# Patient Record
Sex: FEMALE | Race: WHITE | Employment: OTHER | URBAN - METROPOLITAN AREA
[De-identification: names, ages, dates, MRNs, and addresses within clinical notes are randomized per-mention and may not be internally consistent; named-entity substitution may affect disease eponyms.]

---

## 2021-09-02 ENCOUNTER — INITIAL CONSULT (OUTPATIENT)
Dept: PAIN MANAGEMENT | Age: 66
End: 2021-09-02
Payer: OTHER GOVERNMENT

## 2021-09-02 VITALS
HEIGHT: 64 IN | SYSTOLIC BLOOD PRESSURE: 136 MMHG | BODY MASS INDEX: 37.22 KG/M2 | HEART RATE: 72 BPM | WEIGHT: 218 LBS | TEMPERATURE: 97.7 F | DIASTOLIC BLOOD PRESSURE: 80 MMHG

## 2021-09-02 DIAGNOSIS — M47.812 CERVICAL SPONDYLOSIS WITHOUT MYELOPATHY: Primary | ICD-10-CM

## 2021-09-02 DIAGNOSIS — M79.601 RIGHT ARM PAIN: ICD-10-CM

## 2021-09-02 PROCEDURE — 99203 OFFICE O/P NEW LOW 30 MIN: CPT | Performed by: PHYSICAL MEDICINE & REHABILITATION

## 2021-09-02 RX ORDER — PANTOPRAZOLE SODIUM 40 MG/1
TABLET, DELAYED RELEASE ORAL
COMMUNITY
Start: 2021-06-21

## 2021-09-02 RX ORDER — TRAZODONE HYDROCHLORIDE 50 MG/1
TABLET ORAL
COMMUNITY
Start: 2021-08-27

## 2021-09-02 RX ORDER — DULOXETIN HYDROCHLORIDE 30 MG/1
CAPSULE, DELAYED RELEASE ORAL
COMMUNITY

## 2021-09-02 RX ORDER — METFORMIN HYDROCHLORIDE 500 MG/1
TABLET, EXTENDED RELEASE ORAL
COMMUNITY
Start: 2021-09-01

## 2021-09-02 RX ORDER — ESZOPICLONE 3 MG/1
TABLET, FILM COATED ORAL
COMMUNITY

## 2021-09-02 RX ORDER — LIDOCAINE 40 MG/G
CREAM TOPICAL
Qty: 45 G | Refills: 0 | Status: SHIPPED | OUTPATIENT
Start: 2021-09-02

## 2021-09-02 ASSESSMENT — ENCOUNTER SYMPTOMS
CONSTIPATION: 0
BACK PAIN: 0
DIARRHEA: 0
SHORTNESS OF BREATH: 0
NAUSEA: 0
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
## 2021-09-03 ENCOUNTER — TELEPHONE (OUTPATIENT)
Dept: PAIN MANAGEMENT | Age: 66
End: 2021-09-03

## 2021-09-03 NOTE — TELEPHONE ENCOUNTER
BILAT C4-5,C6-,C7-T1 FACET    NO AUTH REQUIRED    OK to schedule procedure approved as above. Please note sides/levels approved and date range. (If applicable, sides/levels approved may differ from those ordered)    TO BE SCHEDULED WITH DR. Aashish Traylor

## 2021-09-03 NOTE — TELEPHONE ENCOUNTER
ORDER PLACED:    Date: 9/2/21  Description: -BILAT C4-5,C6-,C7-T1 FACET  Order Number: [id number]  Ordering Provider: [facility]  Performing Provider:   CPT Codes: 52571,72830,74638  ICD10 Codes: M47.812    PER [facility] WEBPAGE PATIENT IS NOT PRIME, NO AUTH NEEDED

## 2021-09-03 NOTE — TELEPHONE ENCOUNTER
ADIA UPPER EMG    NO AUTH REQUIRED    OK to schedule procedure approved as above. Please note sides/levels approved and date range. (If applicable, sides/levels approved may differ from those ordered)    TO BE SCHEDULED WITH DR. Janet Mariano

## 2021-09-09 ENCOUNTER — TELEPHONE (OUTPATIENT)
Dept: PAIN MANAGEMENT | Age: 66
End: 2021-09-09

## 2021-09-09 NOTE — TELEPHONE ENCOUNTER
Pt scheduled for BILAT C4-5,C6-,C7-T1 FACET on 9/28. Patient is asking Dr. Jeff Mendoza how long before procedure does she have to stop taking advil and aspirin.

## 2021-09-22 ENCOUNTER — TELEPHONE (OUTPATIENT)
Dept: PAIN MANAGEMENT | Age: 66
End: 2021-09-22

## 2021-09-24 ENCOUNTER — TELEPHONE (OUTPATIENT)
Dept: PAIN MANAGEMENT | Age: 66
End: 2021-09-24

## 2021-09-24 NOTE — TELEPHONE ENCOUNTER
BENEFITS: [place] C4-5, C6-7, C7-T1 FACET    Insurance: [address]  Phone: [phone]  Contact Name: Cara Barros  Effective Date: 1.1.2021     Plan year: YES-CALENDAR  Deductible: 150.00      Deductible Met: 150.00  Allowed/benefits paid at: 100%  OOP: 3500.00 MET $1466.92  Freq Limits: 14652, 94224 & 64492-BASED ON MEDICAL NECESSITY  Prior Auth Requirement: NO    Notes: NO PRE-EX CLAUSE    Call Reference #: [id number]    Time of call: 8:50AM

## 2021-09-28 ENCOUNTER — PROCEDURE VISIT (OUTPATIENT)
Dept: PAIN MANAGEMENT | Age: 66
End: 2021-09-28
Payer: OTHER GOVERNMENT

## 2021-09-28 DIAGNOSIS — M47.812 CERVICAL SPONDYLOSIS WITHOUT MYELOPATHY: Primary | ICD-10-CM

## 2021-09-28 PROCEDURE — 64492 INJ PARAVERT F JNT C/T 3 LEV: CPT | Performed by: PHYSICAL MEDICINE & REHABILITATION

## 2021-09-28 PROCEDURE — 64490 INJ PARAVERT F JNT C/T 1 LEV: CPT | Performed by: PHYSICAL MEDICINE & REHABILITATION

## 2021-09-28 PROCEDURE — 64491 INJ PARAVERT F JNT C/T 2 LEV: CPT | Performed by: PHYSICAL MEDICINE & REHABILITATION

## 2021-09-28 RX ORDER — LIDOCAINE HYDROCHLORIDE 10 MG/ML
5 INJECTION, SOLUTION EPIDURAL; INFILTRATION; INTRACAUDAL; PERINEURAL ONCE
Status: COMPLETED | OUTPATIENT
Start: 2021-09-28 | End: 2021-09-28

## 2021-09-28 RX ORDER — DEXAMETHASONE SODIUM PHOSPHATE 10 MG/ML
10 INJECTION, EMULSION INTRAMUSCULAR; INTRAVENOUS ONCE
Status: COMPLETED | OUTPATIENT
Start: 2021-09-28 | End: 2021-09-28

## 2021-09-28 RX ADMIN — Medication 0.5 MEQ: at 11:39

## 2021-09-28 RX ADMIN — DEXAMETHASONE SODIUM PHOSPHATE 10 MG: 10 INJECTION, EMULSION INTRAMUSCULAR; INTRAVENOUS at 11:38

## 2021-09-28 RX ADMIN — LIDOCAINE HYDROCHLORIDE 5 ML: 10 INJECTION, SOLUTION EPIDURAL; INFILTRATION; INTRACAUDAL; PERINEURAL at 11:38

## 2021-09-28 NOTE — PROGRESS NOTES
This procedure was 50% more difficult and required 50% more work secondary to the patient's habitus. The patient has a BMI of 37.4 and has comorbidities of diabetes mellitus, COPD, h/o breast cancer, and osteoarthritis. This required increased work for safe and proper positioning upon the fluoroscopy table, increased needle passes for safe and appropriate needle placement, and increased fluoroscopy time and radiation exposure for proper visualization.

## 2021-09-28 NOTE — PROGRESS NOTES
CERVICAL FACET ZYGAPOPHYSEAL JOINT INJECTION               Patient Name: Romy Murray   : 1955  Date: 2021     Provider: Judie Naranjo MD      Romy Murray is here today for interventional pain management. Preoperatively, the patient presents with cervical facet zygapophyseal joint mediated pain as per history and exam. Patient has failed conservative treatment. Patient has significant functional and psychological impairment due to these conditions. Given her symptoms, physical exam findings, impairment in activities of daily living, and lack of response to conservative measures, consideration for cervical facet zygapophyseal intraarticular joint corticosteroid injections was given. Discussed the risks of the procedure including but not limited to bleeding, infection, worsened pain, damage to surrounding structures, side effects, toxicity, allergic reactions to medications used, immune and stress-response dysfunction, fat necrosis, avascular necrosis, skin pigmentation changes, blood sugar elevation, headache, vision changes, need for surgery, as well as catastrophic injury such as vision loss, paralysis, stroke, spinal cord infarction or injury, intrathecal injection, spinal cord puncture, arachnoiditis, bowel or bladder incontinence, loss of use of the arms and/or legs, ventilator dependence, and death. Discussed the risks, benefits, alternative procedures, and alternatives to the procedure including no procedure at all. Discussed that we cannot undo any permanent neurologic damage or change the course of any underlying disease. After thorough discussion, patient expressed understanding and willingness to proceed. Written consent was obtained and is in the chart. Verbal consent to proceed was obtained. Standard ASIPP guidelines were followed and sterile technique used. Area was cleaned with Betadine three times. Fluoroscopic guidance was used for this procedure.  The C5/6 level is taken to

## 2021-09-29 ENCOUNTER — OFFICE VISIT (OUTPATIENT)
Dept: PAIN MANAGEMENT | Age: 66
End: 2021-09-29
Payer: OTHER GOVERNMENT

## 2021-09-29 VITALS — WEIGHT: 218 LBS | TEMPERATURE: 97.6 F | BODY MASS INDEX: 37.22 KG/M2 | HEIGHT: 64 IN

## 2021-09-29 DIAGNOSIS — M79.601 RIGHT ARM PAIN: Primary | ICD-10-CM

## 2021-09-29 DIAGNOSIS — G56.03 BILATERAL CARPAL TUNNEL SYNDROME: ICD-10-CM

## 2021-09-29 DIAGNOSIS — G89.29 CHRONIC RIGHT SHOULDER PAIN: ICD-10-CM

## 2021-09-29 DIAGNOSIS — M25.511 CHRONIC RIGHT SHOULDER PAIN: ICD-10-CM

## 2021-09-29 PROCEDURE — 95886 MUSC TEST DONE W/N TEST COMP: CPT | Performed by: PHYSICAL MEDICINE & REHABILITATION

## 2021-09-29 PROCEDURE — 95911 NRV CNDJ TEST 9-10 STUDIES: CPT | Performed by: PHYSICAL MEDICINE & REHABILITATION

## 2021-09-29 RX ORDER — CYCLOBENZAPRINE HCL 5 MG
5 TABLET ORAL NIGHTLY
Qty: 30 TABLET | Refills: 0 | Status: SHIPPED | OUTPATIENT
Start: 2021-09-29 | End: 2021-10-29

## 2021-09-29 NOTE — PROGRESS NOTES
-Trial Cyclobenzaprine 5 mg qHS #30 no ref start 9/29/2021 Avoid all other muscle relaxers and/or sedating medicines. -Recommend occupational therapy for bilateral carpal tunnel syndrome  -Recommend bilateral wrist splint(s) for the patient's carpal tunnel syndrome. The patient has weakness and instability of bilateral wrists causing intermittent median nerve compression. She requires stabilization from this rigid orthosis to improve her function and reduce pain.  -Consideration for bilateral carpal tunnel injections under ultrasound guidance may be given if her symptoms do not improve with conservative measures as outlined above or to help facilitate a formal physical therapy program. Discussed the risks including but not limited to bleeding, infection, worsened pain, damage to surrounding structures, side effects, toxicity, allergic reactions to medications used, need for surgery, premature damage or degeneration of the joint, nerve, tendon, or vascular injury, as well as catastrophic injury such as vision loss, paralysis, stroke, bowel or bladder incontinence, ventilator dependence, loss of use of the wrists joint and/or extremities, and death. Discussed the risks, benefits, and alternatives to the procedure including no procedure at all. Discussed that we cannot undo any permanent neurologic or orthopaedic damage or change the course of any underlying disease. After thorough discussion, patient expressed understanding and willingness to proceed. -Right shoulder glenohumeral joint inj under XR with Dr Daphnie Curtis. 15 minute procedure.  Consider 3 mg betamethasone  -XR R Shoulder eval osteoarthritis
activity. Motor unit recruitment is unremarkable. Bilateral mid cervical paraspinal muscle sampling is free of any increased insertional activity or any abnormal spontaneous activity. SUMMARY:  This study is abnormal:  1. There is current electrodiagnostic evidence for a bilateral median mononeuropathy at the wrist consistent with a clinical diagnosis of Bilateral carpal tunnel syndrome. This is mild in degree by electrical criteria bilaterally. There is no active denervation on electromyography bilaterally. 2. There is no current evidence for a bilateral ulnar neuropathy at the elbow, active bilateral cervical motor radiculopathy, or generalized large fiber sensorimotor peripheral polyneuropathy. RECOMMENDATIONS: Today's study does not explain the patient's right arm pain. The findings of bilateral carpal tunnel syndrome should be interpreted within the appropriate clinical context given the patient's history of bilateral carpal tunnel release surgeries. The patient should follow up as previously instructed. If her symptoms persist or worsen, further electrodiagnostic evaluation may be considered if the patient is agreeable. Clinical correlation is recommended.

## 2021-09-30 ENCOUNTER — TELEPHONE (OUTPATIENT)
Dept: PAIN MANAGEMENT | Age: 66
End: 2021-09-30

## 2021-09-30 NOTE — TELEPHONE ENCOUNTER
ORDER PLACED:    Date: 9/29/21  Description: RIGHT SHOULDER GLENOHUMERAL JOINT INJECTION UNDER XR  Order Number: [id number]  Ordering Provider: [id number]  Performing Provider: Kiara Garcia  CPT Codes: 77020  ICD10 Codes: M25.511    PER [employer] WEBPAGE PATIENT IS  SELECT  NO AUTH IS REQUIRED

## 2021-09-30 NOTE — TELEPHONE ENCOUNTER
ADIA CARPAL TUNNEL INJECTION UNDER US    NO AUTH REQUIRED    OK to schedule procedure approved as above. Please note sides/levels approved and date range. (If applicable, sides/levels approved may differ from those ordered)    TO BE SCHEDULED WITH DR. Hortencia Carr

## 2021-09-30 NOTE — TELEPHONE ENCOUNTER
ORDER PLACED:    Date: 9/29/21  Description: BILAT CARPAL TUNNEL BRACE  Order Number: [id number]  Ordering Provider: [facility]  Performing Provider:   CPT Codes:   ICD10 Codes: G56.03    PER [facility] WEBPAGE PATIENT IS  SELECT  NO AUTH IS REQUIRED

## 2021-09-30 NOTE — TELEPHONE ENCOUNTER
RIGHT SHOULDER GLENOHUMERAL JOINT INJECTION UNDER XR    NO AUTH REQUIRED    OK to schedule procedure approved as above. Please note sides/levels approved and date range. (If applicable, sides/levels approved may differ from those ordered)    TO BE SCHEDULED WITH DR. Danni Cooper

## 2021-09-30 NOTE — TELEPHONE ENCOUNTER
ORDER PLACED:    Date: 9/29/21  Description: BILAT CARPAL TUNNEL INJECTION UNDER US  Order Number: [id number]  Ordering Provider: Beatrix Halsted  Performing Provider: Beatrix Halsted  CPT Codes: 44695  ICD10 Codes: G56.03    PER [facility] WEBPAGE PATIENT IS  SELECT  NO AUTH IS REQUIRED

## 2021-10-13 ENCOUNTER — PROCEDURE VISIT (OUTPATIENT)
Dept: PAIN MANAGEMENT | Age: 66
End: 2021-10-13
Payer: OTHER GOVERNMENT

## 2021-10-13 DIAGNOSIS — M25.511 CHRONIC RIGHT SHOULDER PAIN: Primary | ICD-10-CM

## 2021-10-13 DIAGNOSIS — G89.29 CHRONIC RIGHT SHOULDER PAIN: Primary | ICD-10-CM

## 2021-10-13 PROCEDURE — 77002 NEEDLE LOCALIZATION BY XRAY: CPT | Performed by: PHYSICAL MEDICINE & REHABILITATION

## 2021-10-13 PROCEDURE — 20610 DRAIN/INJ JOINT/BURSA W/O US: CPT | Performed by: PHYSICAL MEDICINE & REHABILITATION

## 2021-10-13 RX ORDER — LIDOCAINE HYDROCHLORIDE 10 MG/ML
6 INJECTION, SOLUTION EPIDURAL; INFILTRATION; INTRACAUDAL; PERINEURAL ONCE
Status: COMPLETED | OUTPATIENT
Start: 2021-10-13 | End: 2021-10-13

## 2021-10-13 RX ORDER — BETAMETHASONE SODIUM PHOSPHATE AND BETAMETHASONE ACETATE 3; 3 MG/ML; MG/ML
3 INJECTION, SUSPENSION INTRA-ARTICULAR; INTRALESIONAL; INTRAMUSCULAR; SOFT TISSUE ONCE
Status: COMPLETED | OUTPATIENT
Start: 2021-10-13 | End: 2021-10-13

## 2021-10-13 RX ADMIN — LIDOCAINE HYDROCHLORIDE 6 ML: 10 INJECTION, SOLUTION EPIDURAL; INFILTRATION; INTRACAUDAL; PERINEURAL at 16:39

## 2021-10-13 RX ADMIN — Medication 0.5 MEQ: at 16:37

## 2021-10-13 RX ADMIN — BETAMETHASONE SODIUM PHOSPHATE AND BETAMETHASONE ACETATE 3 MG: 3; 3 INJECTION, SUSPENSION INTRA-ARTICULAR; INTRALESIONAL; INTRAMUSCULAR; SOFT TISSUE at 16:41

## 2021-10-13 NOTE — PROGRESS NOTES
Shoulder Glenohumeral Joint Injection           Patient Name: Chandrakant Villatoro   : 1955  Date: 10/13/2021     Provider: Kera Seaman MD        PROCEDURE: Right glenohumeral joint shoulder corticosteroid injection under fluoroscopic guidance    INDICATIONS: Chandrakant Villatoro is a 61 y.o. female who presents with symptoms and physical exam findings consistent with right shoulder pain. She complains of pain in the right shoulder. A focused physical exam reveals tenderness over the right glenohumeral joint with limited range of motion. She has had persistent pain that limits her activities of daily living such as upper body dressing. The pain is persistent despite conservative measures including home exercise program. Given her symptoms, physical exam findings, impairment in activities of daily living, and lack of response to conservative measures, consideration for Right glenohumeral joint shoulder corticosteroid injection under fluoroscopic guidance was given. Discussed the risks including but not limited to bleeding, infection, worsened pain, damage to surrounding structures, side effects, toxicity, allergic reactions to medications used, immune and stress-response dysfunction, fat necrosis, decreased bone mineralization, cartilage loss, increased fracture risk, avascular necrosis, skin pigmentation changes, blood sugar elevation, need for surgery, premature damage or degeneration of the joint, pneumothorax, as well as catastrophic injury such as vision loss, paralysis, spinal cord or plexus injury, stroke, bowel or bladder incontinence, ventilator dependence, loss of use of the joint and/or extremity, and death. Discussed the risks, benefits, alternative procedures, and alternatives to the procedure including no procedure at all. Discussed that we cannot undo any permanent neurologic or orthopaedic damage or change the course of any underlying disease.  After thorough discussion, patient expressed understanding and willingness to proceed. Written consent was obtained and is in the chart. Verbal consent to proceed was obtained. DESCRIPTION OF PROCEDURE:  The site was marked. A time-out was performed. Fluoroscopy was utilized to visualize the pertinent anatomy. The site was then cleaned with Betadine three times and maintained in a sterile manner throughout the procedure. Then a 27-gauge 1.5 inch needle was used to anesthetize the skin and subcutaneous tissue with 1 mL of 1% preservative-free lidocaine and 0.5 mEq sodium bicarbonate. The needle was then advanced under fluoroscopic guidance onto the humeral head. Aspiration for blood or synovial fluid was negative. Then 5 mL injectate containing 0.5 mL of 3 mg of betamethasone and 4.5 mL of 1% preservative-free lidocaine was injected without difficulty or resistance. The needle was flushed and removed. The site was hemostatic. The site was cleaned and appropriately dressed. The patient tolerated the procedure well. There were no immediate post procedure complications. Post procedure instructions were given. The patient was monitored for a short period of time and discharged home with her baseline neurologic and orthopaedic exam. The patient will follow-up as previously instructed. She was advised that her blood sugars may increase after today's procedure.         [address] [facility] [address]  Phone [phone]/[facility] [phone]

## 2021-10-26 ENCOUNTER — OFFICE VISIT (OUTPATIENT)
Dept: PAIN MANAGEMENT | Age: 66
End: 2021-10-26
Payer: OTHER GOVERNMENT

## 2021-10-26 VITALS
TEMPERATURE: 97.1 F | SYSTOLIC BLOOD PRESSURE: 110 MMHG | WEIGHT: 220 LBS | HEIGHT: 64 IN | BODY MASS INDEX: 37.56 KG/M2 | DIASTOLIC BLOOD PRESSURE: 58 MMHG

## 2021-10-26 DIAGNOSIS — M47.812 CERVICAL SPONDYLOSIS WITHOUT MYELOPATHY: ICD-10-CM

## 2021-10-26 DIAGNOSIS — M48.02 FORAMINAL STENOSIS OF CERVICAL REGION: Primary | ICD-10-CM

## 2021-10-26 DIAGNOSIS — M54.12 CERVICAL RADICULITIS: ICD-10-CM

## 2021-10-26 DIAGNOSIS — M25.511 CHRONIC RIGHT SHOULDER PAIN: ICD-10-CM

## 2021-10-26 DIAGNOSIS — G89.29 CHRONIC RIGHT SHOULDER PAIN: ICD-10-CM

## 2021-10-26 PROBLEM — E11.9 NEWLY DIAGNOSED DIABETES (HCC): Status: ACTIVE | Noted: 2021-10-26

## 2021-10-26 PROBLEM — Z98.1 HISTORY OF FUSION OF CERVICAL SPINE: Status: ACTIVE | Noted: 2020-08-06

## 2021-10-26 PROBLEM — G93.32 CHRONIC FATIGUE SYNDROME: Status: ACTIVE | Noted: 2021-10-26

## 2021-10-26 PROBLEM — Z85.3 HISTORY OF BREAST CANCER: Status: ACTIVE | Noted: 2020-08-06

## 2021-10-26 PROBLEM — M79.7 FIBROMYOSITIS: Status: ACTIVE | Noted: 2021-10-26

## 2021-10-26 PROBLEM — M47.816 LUMBAR SPONDYLOSIS: Status: ACTIVE | Noted: 2019-02-26

## 2021-10-26 PROBLEM — H40.20X0 ANGLE-CLOSURE GLAUCOMA: Status: ACTIVE | Noted: 2021-10-26

## 2021-10-26 PROBLEM — K21.9 GASTROESOPHAGEAL REFLUX DISEASE WITHOUT ESOPHAGITIS: Status: ACTIVE | Noted: 2021-02-17

## 2021-10-26 PROCEDURE — 99213 OFFICE O/P EST LOW 20 MIN: CPT | Performed by: NURSE PRACTITIONER

## 2021-10-26 RX ORDER — SEMAGLUTIDE 1.34 MG/ML
INJECTION, SOLUTION SUBCUTANEOUS
COMMUNITY
Start: 2021-09-27

## 2021-10-26 ASSESSMENT — ENCOUNTER SYMPTOMS
COUGH: 0
SHORTNESS OF BREATH: 0
TROUBLE SWALLOWING: 0
BACK PAIN: 0
CONSTIPATION: 0
GASTROINTESTINAL NEGATIVE: 1
DIARRHEA: 0
EYES NEGATIVE: 1

## 2021-10-26 NOTE — PROGRESS NOTES
Michael Gallegos  ([id number])    10/26/2021    Subjective:     Michael Gallegos is 61 y.o. female who complains today of:    Chief Complaint   Patient presents with    Neck Pain    Shoulder Pain     Right         Allergies:  Contrast [barium-containing compounds] and Morphine    Past Medical History:   Diagnosis Date    [facility]     Cerebrovascular disease     Chronic pain     COPD (chronic obstructive pulmonary disease) (HCC)     Depression     Fibromyalgia     Gastrointestinal problems related to pregnancy     Neuropathy     Neuropathy in diabetes ([facility]     Osteoarthritis      Past Surgical History:   Procedure Laterality Date    CARPAL TUNNEL RELEASE Bilateral     CERVICAL SPINE SURGERY      C5/6 with Dr Germán Bowers      ULNAR TUNNEL RELEASE Left     Left ulnar nerve transposition at the elbow     Family History   Problem Relation Age of Onset    Arthritis Mother     Cancer Mother     Diabetes Mother     Arthritis Father     Cancer Father     Diabetes Father      Social History     Socioeconomic History    Marital status:      Spouse name: Not on file    Number of children: Not on file    Years of education: Not on file    Highest education level: Not on file   Occupational History    Not on file   Tobacco Use    Smoking status: Never Smoker    Smokeless tobacco: Never Used   Substance and Sexual Activity    Alcohol use: Not Currently    Drug use: Never    Sexual activity: Not on file   Other Topics Concern    Not on file   Social History Narrative    Not on file     Social Determinants of Health     Financial Resource Strain:     Difficulty of Paying Living Expenses:    Food Insecurity:     Worried About Running Out of Food in the Last Year:     [address] in the Last Year:    Transportation Needs:     Lack of Transportation (Medical):      Lack of Transportation (Non-Medical):    Physical Activity:     Days of Exercise per Week:     Minutes of Exercise per Session:    Stress:     Feeling of Stress :    Social Connections:     Frequency of Communication with Friends and Family:     Frequency of Social Gatherings with Friends and Family:     Attends Denominational Services:     Active Member of Clubs or Organizations:     Attends Club or Organization Meetings:     Marital Status:    Intimate Partner Violence:     Fear of Current or Ex-Partner:     Emotionally Abused:     Physically Abused:     Sexually Abused:        Current Outpatient Medications on File Prior to Visit   Medication Sig Dispense Refill    Semaglutide,0.25 or 0.5MG/DOS, (OZEMPIC, 0.25 OR 0.5 MG/DOSE,) 2 MG/1.5ML SOPN       cyclobenzaprine (FLEXERIL) 5 MG tablet Take 1 tablet by mouth nightly 30 tablet 0    metFORMIN (GLUCOPHAGE-XR) 500 MG extended release tablet       DULoxetine (CYMBALTA) 30 MG extended release capsule       traZODone (DESYREL) 50 MG tablet       eszopiclone (ESZOPICLONE) 3 MG TABS       pantoprazole (PROTONIX) 40 MG tablet       lidocaine (LMX) 4 % cream Apply a half dollar sized amount to intact skin topically up to twice daily as needed for pain 45 g 0     No current facility-administered medications on file prior to visit. Pt presents today for a f/u of her pain. PCP is Dr. Cyrus Barajas MD.  Patient last saw Dr. Meli Hansen on 10/13/2021 for right shoulder injection which didn't offer much relief and says \"cramp hasn't gone away\", but then later says the ache in her shoulder is gone. Says pain is where neck and shoulder area meet and \"burning\" at the base of her neck. Sees Dr Kelsey Hinojosa. Says seen ortho for shoulder. Upper extremity EMG done 9/29/2021 report was abnormal showing bilateral carpal tunnel syndrome. Trial of Flexeril 5 mg nightly, recommendation of occupational therapy for carpal tunnel syndrome and wrist splints possible injections which were ordered.   She says flexeril helped at night, but not during the day. Doesn't want pain medications. X-ray of right shoulder ordered on 9/28/2021 had bilateral C4-5, C6-7, C7-T1 facet joint injections without relief she reports. She says her pain is constant and says the pain is \"tight\" and says she can \"double over\" when she moves her head. Says it feels like a \"popping\" in her neck but feels like \"something is stabbing me\". Says this has been going on since Spring time. She does report her spasms are a little better. She had her initial consult with Dr. Gerlene Mortimer on 9-21 for her neck pain and arm pain. She has a history of cervical fusion C5-6 with Dr. Emmy Ravi. Completed physical therapy in the summer 2021 at [facility]. Past medical history of breast cancer with radiation and lumpectomy, diabetes, COPD, depression and anxiety, sleep apnea. Pt feels pain level 5/10. Pt feels that turning neck, bending, using Rt arm makes the pain worse, and not using Rt arm makes the pain better. Pt feels her medication helps   her function and improve her quality of life. Pt admits to UE radiating numbness and tingling. Denies recent falls, injuries or trauma. Pt denies new weakness. Pt reports PT has been done in the past.         Review of Systems   Constitutional: Negative. Negative for fatigue. HENT: Negative. Negative for trouble swallowing. Eyes: Negative. Respiratory: Negative for cough and shortness of breath. Cardiovascular: Negative for chest pain. Gastrointestinal: Negative. Negative for constipation and diarrhea. Endocrine: Negative. Recently put on ozempic for her DM   Genitourinary: Negative. Musculoskeletal: Positive for arthralgias and neck pain. Negative for back pain. Skin: Negative. Neurological: Negative for dizziness, weakness and headaches. Hematological: Negative. Psychiatric/Behavioral: Negative.         Reviewed Dr. Cecilia Pierson notes and reports:  MRI C Spine 8/23/21: degenerative disc disease and facet arthropathy. anterolisthesis C3/4. Retrolisthesis C6/7. No fracture. C5/6 fusion. C2/3 normal canal and foramen. C3/4 mild left foramen narrowing, normal canal, normal right foramen. C4/5 mild canal, mild bilateral foramen narrowing. C5/6 normal canal, normal right foramen, mild left foramen narrowing. C6/7 mild bilateral foramen narrowing, mild canal stenosis. C7/T1 normal canal and foramen. Perineural cyst T1/2.   ORT Score = 4, moderate risk  Objective:     Vitals:  BP (!) 110/58   Temp 97.1 °F (36.2 °C)   Ht 5' 4\" (1.626 m)   Wt 220 lb (99.8 kg)   BMI 37.76 kg/m² Pain Score:   5      Physical Exam  Vitals and nursing note reviewed. This is a pleasant female who answers questions appropriately and follows commands. Pt is alert and oriented x 3. Recent and remote memory is intact. Mood and affect, judgement and insight are normal.  No signs of distress, no dyspnea or SOB noted. HEENT: PERRL. Neck is supple, trachea midline. No lymphadenopathy noted. Decreased ROM with flexion, extension and rotation of cervical spine. Tightness in trapezius with palpation noted. Mild tenderness with palpation to cervical spinal muscles on Rt. Non-tender with palpation over cervical spine. Positive Spurling's maneuver. Negative Sparkle's sign. Strong grasp B/L. Strength is functional in UE bilaterally. Pulses are intact. Surgical scar. Cranial nerves II-XII are intact. Assessment:      Diagnosis Orders   1. Foraminal stenosis of cervical region  AL NJX AA&/STRD TFRML EPI CERVICAL/THORACIC 1 LEVEL    CHG FLUOR NEEDLE/CATH SPINE/PARASPINAL DX/THER ADDON   2. Cervical radiculitis  AL NJX AA&/STRD TFRML EPI CERVICAL/THORACIC 1 LEVEL    CHG FLUOR NEEDLE/CATH SPINE/PARASPINAL DX/THER ADDON   3.  Chronic right shoulder pain     4. Cervical spondylosis without myelopathy         Plan:     Periodic Controlled Substance Monitoring: Possible medication side effects, risk of tolerance/dependence & alternative treatments discussed., No signs of potential drug abuse or diversion identified. , Assessed functional status., Obtaining appropriate analgesic effect of treatment. (Kelly Mcmahan, GINNY - CNP)    No orders of the defined types were placed in this encounter. Orders Placed This Encounter   Procedures    CHG FLUOR NEEDLE/CATH SPINE/PARASPINAL DX/THER ADDON     Standing Status:   Future     Standing Expiration Date:   1/24/2022    NH NJX AA&/STRD TFRML EPI CERVICAL/THORACIC 1 LEVEL     Rt C7-T1 intralaminar LUCY with SM     Standing Status:   Future     Standing Expiration Date:   1/24/2022     Discussed options with the patient today. Anatomic model pathology was shown and reviewed with pt. after much discussion and thought and reviewed exam and MRI reports with patient as well as Dr. Martha Lin will order a right C7-T1 interlaminar epidural injection under fluoroscopy. Unfortunately facet joint injections did not offer much relief of the neck nor did the shoulder injection. She is not interested in medication. She is done physical therapy. . All questions were answered. Discussed home exercise program.  Relevant imaging and pain generators reviewed. Pt verbalized understanding and agrees with above plan. Pt has chronic pain. OARRS was reviewed. This NP saw pt under direct supervision of Dr. Martha Lin. Follow up:  Return for for procedure with Dr. Martha Lin.     Kika Mcmahan, GINNY - CNP

## 2021-10-27 ENCOUNTER — TELEPHONE (OUTPATIENT)
Dept: PAIN MANAGEMENT | Age: 66
End: 2021-10-27

## 2021-10-27 NOTE — TELEPHONE ENCOUNTER
RIGHT C7-T1 INTRALAMINAR    NO AUTH REQUIRED    OK to schedule procedure approved as above. Please note sides/levels approved and date range. (If applicable, sides/levels approved may differ from those ordered)    TO BE SCHEDULED WITH DR. Erica Pack

## 2021-10-27 NOTE — TELEPHONE ENCOUNTER
ORDER PLACED:    Date: 10/26/21  Description: RIGHT C7-T1 INTRALAMINAR  Order Number: [id number]  Ordering Provider: Jeanie Morrissey  Performing Provider: Michael Beck  CPT Codes: 81561  ICD10 Codes: M54.12    PER [employer] WEBPAGE PATIENT IS  SELECT, NO AUTH REQUIRED

## 2021-10-28 ENCOUNTER — TELEPHONE (OUTPATIENT)
Dept: PAIN MANAGEMENT | Age: 66
End: 2021-10-28

## 2021-10-28 NOTE — TELEPHONE ENCOUNTER
BENEFITS: [facility]    Insurance:  EAST  Phone: [phone]  Contact Name: Jerome Pelayo  Effective Date: 1.1.2018     Plan year: YES-CALENDAR  Deductible: 150.00      Deductible Met: 150.00  Allowed/benefits paid at: 75% AFTER DEDUCTIBLE  OOP: 3500.00 MET $1847.59  Freq Limits: 50051 & 95886-BASED ON MEDICAL NECESSITY  Prior Auth Requirement: NO    Notes: NO PRE-EX CLAUSE    Call Reference #: [id number]    Time of call: 8:10AM

## 2021-11-11 ENCOUNTER — TELEPHONE (OUTPATIENT)
Dept: PAIN MANAGEMENT | Age: 66
End: 2021-11-11

## 2021-11-11 NOTE — TELEPHONE ENCOUNTER
BENEFITS: RT C7-T1 INTRALAMINAR    Insurance:  EAST  Phone: [phone]  Contact Name: IVR  Effective Date: 1.1.2018     Plan year: YES-CALENDAR  Deductible: 150.00      Deductible Met: 150.00  Allowed/benefits paid at: 75% AFTER DEDUCTIBLE  OOP: 3500.00 MET $1847.59  Freq Limits: 64479-BASED ON MEDICAL NECESSITY  Prior Auth Requirement: NO    Notes: NO PRE-EX CLAUSE    Call Reference #: [id number]    Time of call: 1:50PM

## 2021-11-16 ENCOUNTER — PROCEDURE VISIT (OUTPATIENT)
Dept: PAIN MANAGEMENT | Age: 66
End: 2021-11-16

## 2021-11-16 DIAGNOSIS — M48.02 FORAMINAL STENOSIS OF CERVICAL REGION: Primary | ICD-10-CM

## 2021-11-16 PROCEDURE — 99024 POSTOP FOLLOW-UP VISIT: CPT | Performed by: PHYSICAL MEDICINE & REHABILITATION

## 2021-11-16 NOTE — PROGRESS NOTES
Patient started an antibiotic for an upper respiratory tract/sinus infection. Will reschedule today's cervical epidural for a time when she has completed her antibiotics and is feeling well.

## 2021-12-14 ENCOUNTER — PROCEDURE VISIT (OUTPATIENT)
Dept: PAIN MANAGEMENT | Age: 66
End: 2021-12-14
Payer: OTHER GOVERNMENT

## 2021-12-14 DIAGNOSIS — M54.12 CERVICAL RADICULOPATHY: Primary | ICD-10-CM

## 2021-12-14 PROCEDURE — 62321 NJX INTERLAMINAR CRV/THRC: CPT | Performed by: PHYSICAL MEDICINE & REHABILITATION

## 2021-12-14 RX ORDER — SODIUM CHLORIDE 9 MG/ML
3 INJECTION INTRAVENOUS ONCE
Status: COMPLETED | OUTPATIENT
Start: 2021-12-14 | End: 2021-12-14

## 2021-12-14 RX ORDER — LIDOCAINE HYDROCHLORIDE 10 MG/ML
4 INJECTION, SOLUTION EPIDURAL; INFILTRATION; INTRACAUDAL; PERINEURAL ONCE
Status: COMPLETED | OUTPATIENT
Start: 2021-12-14 | End: 2021-12-14

## 2021-12-14 RX ORDER — DEXAMETHASONE SODIUM PHOSPHATE 10 MG/ML
10 INJECTION, EMULSION INTRAMUSCULAR; INTRAVENOUS ONCE
Status: COMPLETED | OUTPATIENT
Start: 2021-12-14 | End: 2021-12-14

## 2021-12-14 RX ADMIN — DEXAMETHASONE SODIUM PHOSPHATE 10 MG: 10 INJECTION, EMULSION INTRAMUSCULAR; INTRAVENOUS at 15:48

## 2021-12-14 RX ADMIN — Medication 0.5 MEQ: at 15:49

## 2021-12-14 RX ADMIN — SODIUM CHLORIDE 3 ML: 9 INJECTION INTRAVENOUS at 15:49

## 2021-12-14 RX ADMIN — LIDOCAINE HYDROCHLORIDE 4 ML: 10 INJECTION, SOLUTION EPIDURAL; INFILTRATION; INTRACAUDAL; PERINEURAL at 15:50

## 2021-12-14 NOTE — PROGRESS NOTES
Cervical Interlaminar Epidural Corticosteroid Injection (ILESI) under Fluoroscopic Guidance         Patient Name: Renato Valdes  : 1955  Date: 2021   Provider: David Richards MD    PROCEDURE:  Cervical interlaminar epidural corticosteroid injection (ILESI) at the C7/T1 level under fluoroscopic guidance    INDICATIONS: Renato Valdes is a 61 y.o. female who presents with symptoms and physical exam findings consistent with cervical radiculopathy. She has had persistent pain that limits her activities of daily living such as upper body dressing. The pain is persistent despite conservative measures including home exercise program. Given her symptoms, physical exam findings, impairment in activities of daily living, and lack of response to conservative measures, consideration for C7/T1 Cervical interlaminar epidural corticosteroid injection under fluoroscopic guidance was given. Discussed the risks including but not limited to bleeding, infection, worsened pain, damage to surrounding structures, side effects, toxicity, allergic reactions to medications used, need for surgery, headache, vision changes, difficulty with chewing and/or swallowing, pneumothorax, immune and stress-response dysfunction, fat necrosis, avascular necrosis, skin pigmentation changes, blood sugar elevation, as well as catastrophic injury such as vision loss, paralysis, spinal cord or plexus injury, cerebral brainstem or spinal cord infarction, intrathecal injection, spinal cord puncture, persistent dural leak, arachnoiditis, stroke, bowel or bladder incontinence, ventilator dependence, loss of use of the arms and/or legs, and death. Discussed off-label use of corticosteroids and how the Food and Drug Administration (FDA) has not approved corticosteroids for epidural use. Discussed the risks, benefits, alternative procedures, and alternatives to the procedure including no procedure at all.  Discussed her elevated risk given her diabetic Post procedure instructions were given. The patient will follow-up as previously instructed. She was advised that her blood sugars may increase after today's procedure.              Beatrice, [address] [facility], [facility] [address]  Phone [phone]/[facility] [phone]

## 2021-12-21 NOTE — TELEPHONE ENCOUNTER
I called patient to schedule her an apt to sign and receive her CTS splints. Patient was not aware that Dr. Janice Powell ordered them. She received splints from somewhere else.

## 2022-02-16 ENCOUNTER — TELEPHONE (OUTPATIENT)
Dept: PAIN MANAGEMENT | Age: 67
End: 2022-02-16

## 2022-02-16 NOTE — TELEPHONE ENCOUNTER
BENEFITS: LT CARPAL TUNNEL INJ UNDER US    Insurance: [employer]  Phone: [phone]  Contact Name: Galileo Bustos  Effective Date: 1.1.2018     Plan year: YES-CALENDAR  Deductible: 150.00      Deductible Met: 150.00  Allowed/benefits paid at: 100% AFTER DEDUCTIBLE  OOP: 3706.00 MET $462.63  Freq Limits: 20526--BASED ON MEDICAL NECESSITY  Prior Auth Requirement: NO AUTH REQUIRED    Notes: NO PRE-EX CLAUSE    Call Reference #: [id number]    Time of call: 8:25AM

## 2022-02-21 ENCOUNTER — PROCEDURE VISIT (OUTPATIENT)
Dept: PAIN MANAGEMENT | Age: 67
End: 2022-02-21
Payer: OTHER GOVERNMENT

## 2022-02-21 DIAGNOSIS — G56.02 CARPAL TUNNEL SYNDROME OF LEFT WRIST: Primary | ICD-10-CM

## 2022-02-21 DIAGNOSIS — M75.41 IMPINGEMENT SYNDROME OF RIGHT SHOULDER: ICD-10-CM

## 2022-02-21 PROCEDURE — 76942 ECHO GUIDE FOR BIOPSY: CPT | Performed by: PHYSICAL MEDICINE & REHABILITATION

## 2022-02-21 PROCEDURE — 20526 THER INJECTION CARP TUNNEL: CPT | Performed by: PHYSICAL MEDICINE & REHABILITATION

## 2022-02-21 RX ORDER — LOTEPREDNOL ETABONATE 10 MG/ML
SUSPENSION TOPICAL
COMMUNITY

## 2022-02-21 RX ORDER — LIDOCAINE HYDROCHLORIDE 10 MG/ML
4.5 INJECTION, SOLUTION INFILTRATION; PERINEURAL ONCE
Status: SHIPPED | OUTPATIENT
Start: 2022-02-21

## 2022-02-21 RX ORDER — KETOROLAC TROMETHAMINE 5 MG/ML
1 SOLUTION OPHTHALMIC 4 TIMES DAILY
COMMUNITY

## 2022-02-21 RX ORDER — BETAMETHASONE SODIUM PHOSPHATE AND BETAMETHASONE ACETATE 3; 3 MG/ML; MG/ML
3 INJECTION, SUSPENSION INTRA-ARTICULAR; INTRALESIONAL; INTRAMUSCULAR; SOFT TISSUE ONCE
Status: SHIPPED | OUTPATIENT
Start: 2022-02-21

## 2022-02-21 NOTE — PROGRESS NOTES
Carpal Tunnel Corticosteroid Injection under Ultrasound Guidance    Patient Name: Douglas Puckett   : 1955  Date: 2022     Provider: Zak Valerio MD        PROCEDURE: Left carpal tunnel corticosteroid injection under ultrasound guidance    INDICATIONS: Discussed the risks of the steroid injection procedure includes but is not limited to bleeding, infection, local skin irritation, skin atrophy, calcification, skin color and pigmentation changes, worsened pain and irritation, burning, damage to surrounding structures, side effects, toxicity, allergic reactions to medications used, immune and stress-response dysfunction, fat necrosis, decreased bone mineralization, increased fracture risk, blood sugar elevation, need for surgery, premature damage or degeneration of the nearby joints, as well as catastrophic injury such as vision loss, paralysis, stroke, loss of use of the wrist and use of the hand, and death. Discussed the risks, benefits, alternative procedures, and alternatives to the procedure including no procedure at all. Discussed that we cannot undo any permanent neurologic or orthopaedic damage or change the course of any underlying disease. After thorough discussion, patient expressed complete understanding and willingness to proceed. Description of Procedure: The site was marked. A time-out was performed. Ultrasound was used to visualize the pertinent anatomy and identify any critical neurovascular structures. The site was then prepped in a sterile manner with Betadine three times and alcohol. Then a 27-gauge 1.5 inch needle was advanced under direct ultrasound guidance in the interspace between the median nerve and ulnar artery in transverse fashion. The needle was than advanced towards the transverse carpal ligament in longitudinal fashion. Aspiration for blood was negative.  Then a 5 mL injectate containing 0.5 mL of 3 mg of betamethasone and 4.5 mL of 1% preservative-free lidocaine was injected without resistance or difficulty. Appropriate spread of the injectate was directly visualized under ultrasound. The needle was flushed and removed. The site was hemostatic. The site was cleaned and appropriately dressed. The patient tolerated the procedure well. There were no immediate post procedure complications. Post procedure instructions were given. The patient will follow-up as previously instructed. She was advised that her blood sugars may increase after today's procedure.           [address] [facility] [address]  Phone [phone]/[facility] [phone]

## 2022-02-21 NOTE — PROGRESS NOTES
She has chronic pain in the right shoulder that gets up to a 8/10. Worse with liftin gher arm. Constant ache for over 1 year. A focused physical exam demonstrates right shoulder +Benitez and positive impingement findings.    -Right Shoulder subacromial bursa inj under XR with Dr Jimena Díaz. 15 minute procedure. Consider 3 mg betamethasone. This procedure was 30% more difficult and required 30% more work secondary to the patient's habitus. The patient has a BMI of 37.8 and has comorbidities of diabetes mellitus, COPD, history of breast cancer, and osteoarthritis. This required increased work for safe and proper positioning upon the procedure table, increased needle passes for safe and appropriate needle placement, and increased ultrasound time for proper visualization.

## 2022-02-22 ENCOUNTER — TELEPHONE (OUTPATIENT)
Dept: PAIN MANAGEMENT | Age: 67
End: 2022-02-22

## 2022-02-22 NOTE — TELEPHONE ENCOUNTER
ORDER PLACED:    Date: 2/21/22  Description: RIGHT SHOULDER SUBACROMIAL BURSA INJECTION UNDER XR  Order Number: [id number]  Ordering Provider: [facility]  Performing Provider: JULIA  CPT Codes: 47213,57755  ICD10 Codes: M75.41    PER [employer] WEBPAGE, PATIENT IS  SELECT, NO AUTH IS REQUIRED

## 2022-02-22 NOTE — TELEPHONE ENCOUNTER
RIGHT SHOULDER SUBACROMIAL BURSA INJECTION UNDER XR    NO AUTH REQUIRED    OK to schedule procedure approved as above. Please note sides/levels approved and date range. (If applicable, sides/levels approved may differ from those ordered)    TO BE SCHEDULED WITH DR. Greta Horton

## 2022-03-02 ENCOUNTER — TELEPHONE (OUTPATIENT)
Dept: PAIN MANAGEMENT | Age: 67
End: 2022-03-02

## 2022-03-02 NOTE — TELEPHONE ENCOUNTER
BENEFITS: RT SHOULDER SUBACROMIAL BURSA INJ    Insurance: [employer]  Phone: [phone]  Contact Name: Reddy Swain  Effective Date: 2022     Plan year: YES-  Deductible: 150.00      Deductible Met: 150.00  Allowed/benefits paid at: 100%  OOP: 3706.00 MET $474.63  Freq Limits: 20610--BASED ON MEDICAL NECESSITY  Prior Auth Requirement: NO AUTH REQUIRED    Notes: NO PRE-EX CLAUSE    Call Reference #: [id number]    Time of call: 11:00AM

## 2022-03-09 ENCOUNTER — PROCEDURE VISIT (OUTPATIENT)
Dept: PAIN MANAGEMENT | Age: 67
End: 2022-03-09
Payer: OTHER GOVERNMENT

## 2022-03-09 DIAGNOSIS — M75.41 IMPINGEMENT SYNDROME OF RIGHT SHOULDER: Primary | ICD-10-CM

## 2022-03-09 PROCEDURE — 77002 NEEDLE LOCALIZATION BY XRAY: CPT | Performed by: PHYSICAL MEDICINE & REHABILITATION

## 2022-03-09 PROCEDURE — 20610 DRAIN/INJ JOINT/BURSA W/O US: CPT | Performed by: PHYSICAL MEDICINE & REHABILITATION

## 2022-03-09 RX ORDER — LIDOCAINE HYDROCHLORIDE 10 MG/ML
6 INJECTION, SOLUTION INFILTRATION; PERINEURAL ONCE
Status: COMPLETED | OUTPATIENT
Start: 2022-03-09 | End: 2022-03-09

## 2022-03-09 RX ORDER — BETAMETHASONE SODIUM PHOSPHATE AND BETAMETHASONE ACETATE 3; 3 MG/ML; MG/ML
3 INJECTION, SUSPENSION INTRA-ARTICULAR; INTRALESIONAL; INTRAMUSCULAR; SOFT TISSUE ONCE
Status: COMPLETED | OUTPATIENT
Start: 2022-03-09 | End: 2022-03-09

## 2022-03-09 RX ADMIN — LIDOCAINE HYDROCHLORIDE 6 ML: 10 INJECTION, SOLUTION INFILTRATION; PERINEURAL at 16:37

## 2022-03-09 RX ADMIN — BETAMETHASONE SODIUM PHOSPHATE AND BETAMETHASONE ACETATE 3 MG: 3; 3 INJECTION, SUSPENSION INTRA-ARTICULAR; INTRALESIONAL; INTRAMUSCULAR; SOFT TISSUE at 16:36

## 2022-03-09 RX ADMIN — Medication 0.5 MEQ: at 16:37

## 2022-03-09 NOTE — PROGRESS NOTES
Patient Name: Brayan Loco   : 1955     Date: 3/9/2022    Physician: Neo Mac MD          PRE PROCEDURE DIAGNOSIS:  Right shoulder impingement    POST PROCEDURE DIAGNOSIS:  Right shoulder impingement    PROCEDURE: Right shoulder subacromial bursa corticosteroid injection under fluoroscopic guidance    INDICATIONS: Brayan Loco is a 61 y.o. female who presents with symptoms and physical exam findings consistent with right shoulder impingement. She has had persistent pain that limits her activities of daily living such as upper body dressing and overhead reaching. The pain is persistent despite conservative measures including home exercise program. Given her symptoms, physical exam findings, impairment in activities of daily living, and lack of response to conservative measures, consideration for right shoulder subacromial bursa corticosteroid injection under fluoroscopic guidance was given. Discussed the risks including but not limited to bleeding, infection, worsened pain, damage to surrounding structures, side effects, toxicity, allergic reactions to medications used, immune and stress-response dysfunction, fat necrosis, decreased bone mineralization, cartilage loss, increased fracture risk, avascular necrosis, skin pigmentation changes, blood sugar elevation, need for surgery, premature damage or degeneration of the joint, pneumothorax, as well as catastrophic injury such as vision loss, paralysis, spinal cord or plexus injury, stroke, bowel or bladder incontinence, ventilator dependence, loss of use of the joint and/or extremity, and death. Discussed her elevated risk given her diabetic status, and the risk of hyperglycemia, uncontrolled blood sugars, sepsis, and death. Discussed the risks, benefits, alternative procedures, and alternatives to the procedure including no procedure at all.  Discussed that we cannot undo any permanent neurologic or orthopaedic damage or change the course of any underlying disease. After thorough discussion, patient expressed understanding and willingness to proceed. Written consent was obtained and is in the chart. Verbal consent to proceed was obtained. DESCRIPTION OF PROCEDURE:  The site was marked. A time-out was performed. Fluoroscopy was utilized to visualize the pertinent anatomy. The site was then cleaned with Betadine three times and maintained in a sterile manner throughout the procedure. Then a 27-gauge 1.5 inch needle was advanced under intermittent fluoroscopic guidance into the subacromial bursa. Aspiration for blood was negative. Then a 5 mL injectate containing 0.5 mL of 3 mg of betamethasone and 4.5 mL of 1% preservative-free lidocaine was injected without resistance or difficulty. The needle was flushed and removed. The site was hemostatic. The site was cleaned and appropriately dressed. The patient tolerated the procedure well. There were no immediate post procedure complications. Post procedure instructions were given. The patient was monitored for a short period of time and discharged home with her baseline neurologic and orthopaedic exam. The patient will follow-up as previously instructed. She was advised that her blood sugars may increase after today's procedure.                 [address] [facility], [address]  Phone [phone]/[facility] [phone]

## 2022-03-09 NOTE — PROGRESS NOTES
This procedure was 30% more difficult and required 30% more work secondary to the patient's habitus. The patient has a BMI of 37.8 and has comorbidities of diabetes mellitus, COPD, history of breast cancer, and osteoarthritis. This required increased work for safe and proper positioning upon the procedure table, increased needle passes for safe and appropriate needle placement, and increased ultrasound time for proper visualization.

## 2022-08-29 ENCOUNTER — TELEPHONE (OUTPATIENT)
Dept: PAIN MANAGEMENT | Age: 67
End: 2022-08-29

## 2022-08-29 DIAGNOSIS — G56.01 CARPAL TUNNEL SYNDROME OF RIGHT WRIST: Primary | ICD-10-CM

## 2022-08-29 DIAGNOSIS — G56.02 CARPAL TUNNEL SYNDROME OF LEFT WRIST: ICD-10-CM

## 2022-08-29 NOTE — TELEPHONE ENCOUNTER
Patient called asking if she could schedule a carpal tunnel injection? She says that she had medicare as of yesterday.

## 2022-09-05 NOTE — TELEPHONE ENCOUNTER
[facility] UE 9/29/21 reviewed    Right carpal tunnel corticosteroid injection under ultrasound with Dr. Sanchez Hays. 15-minute procedure.

## 2022-09-12 NOTE — TELEPHONE ENCOUNTER
Right carpal tunnel corticosteroid injection under ultrasound     NO AUTH REQUIRED    OK to schedule procedure approved as above. Please note sides/levels approved and date range. (If applicable, sides/levels approved may differ from those ordered)    TO BE SCHEDULED WITH DR. Basim Mir

## 2022-10-07 ENCOUNTER — PROCEDURE VISIT (OUTPATIENT)
Dept: PAIN MANAGEMENT | Age: 67
End: 2022-10-07
Payer: MEDICARE

## 2022-10-07 DIAGNOSIS — G56.01 CARPAL TUNNEL SYNDROME OF RIGHT WRIST: Primary | ICD-10-CM

## 2022-10-07 PROCEDURE — 20526 THER INJECTION CARP TUNNEL: CPT | Performed by: PHYSICAL MEDICINE & REHABILITATION

## 2022-10-07 PROCEDURE — 76942 ECHO GUIDE FOR BIOPSY: CPT | Performed by: PHYSICAL MEDICINE & REHABILITATION

## 2022-10-07 RX ORDER — LIDOCAINE HYDROCHLORIDE 10 MG/ML
4.5 INJECTION, SOLUTION INFILTRATION; PERINEURAL ONCE
Status: COMPLETED | OUTPATIENT
Start: 2022-10-07 | End: 2022-10-07

## 2022-10-07 RX ORDER — BETAMETHASONE SODIUM PHOSPHATE AND BETAMETHASONE ACETATE 3; 3 MG/ML; MG/ML
3 INJECTION, SUSPENSION INTRA-ARTICULAR; INTRALESIONAL; INTRAMUSCULAR; SOFT TISSUE ONCE
Status: COMPLETED | OUTPATIENT
Start: 2022-10-07 | End: 2022-10-07

## 2022-10-07 RX ADMIN — BETAMETHASONE SODIUM PHOSPHATE AND BETAMETHASONE ACETATE 3 MG: 3; 3 INJECTION, SUSPENSION INTRA-ARTICULAR; INTRALESIONAL; INTRAMUSCULAR; SOFT TISSUE at 13:18

## 2022-10-07 RX ADMIN — LIDOCAINE HYDROCHLORIDE 4.5 ML: 10 INJECTION, SOLUTION INFILTRATION; PERINEURAL at 13:18

## 2022-10-07 NOTE — PROGRESS NOTES
Carpal Tunnel Corticosteroid Injection under Ultrasound Guidance    Patient Name: Serina Favre   : 1955  Date: 10/7/2022     Provider: Javier Cook MD        PROCEDURE: Right carpal tunnel corticosteroid injection under ultrasound guidance    INDICATIONS: Discussed the risks of the steroid injection procedure includes but is not limited to bleeding, infection, local skin irritation, skin atrophy, calcification, skin color and pigmentation changes, worsened pain and irritation, burning, damage to surrounding structures, side effects, toxicity, allergic reactions to medications used, immune and stress-response dysfunction, fat necrosis, decreased bone mineralization, increased fracture risk, blood sugar elevation, need for surgery, premature damage or degeneration of the nearby joints, as well as catastrophic injury such as vision loss, paralysis, stroke, loss of use of the wrist and use of the hand, and death. Discussed the risks, benefits, alternative procedures, and alternatives to the procedure including no procedure at all. Discussed that we cannot undo any permanent neurologic or orthopaedic damage or change the course of any underlying disease. After thorough discussion, patient expressed complete understanding and willingness to proceed. Description of Procedure: The site was marked. A time-out was performed. Ultrasound was used to visualize the pertinent anatomy and identify any critical neurovascular structures. The site was then prepped in a sterile manner with Betadine three times and alcohol. Then a 27-gauge 1.5 inch needle was advanced under direct ultrasound guidance in the interspace between the median nerve and ulnar artery in a transverse fashion. The needle was than advanced towards the transverse carpal ligament in longitudinal fashion. Aspiration for blood was negative.  Then a 5 mL injectate containing 0.5 mL of 3 mg of betamethasone and 4.5 mL of 1% preservative-free lidocaine was injected without resistance or difficulty. Appropriate spread of the injectate was directly visualized under ultrasound. The needle was flushed and removed. The site was hemostatic. The site was cleaned and appropriately dressed. The patient tolerated the procedure well. There were no immediate post procedure complications. Post procedure instructions were given. The patient will follow-up as previously instructed. She was advised that her blood sugars may increase after today's procedure.           [address]  Phone [phone]